# Patient Record
(demographics unavailable — no encounter records)

---

## 2025-04-02 NOTE — PROCEDURE
[FreeTextEntry1] :  Thyroid Ultrasound: 04/01/2025 Right solid isoechoic nodule w/one spot of course of calcification:  23mm x 17mm x 28mm  Thyroid Ultrasound: 03/28/2024 Right dominant isoechoic nodule w/ small course of calcification: 22mm x 20mm x 28mm   Bone Mineral Density: 03/28/2024 Indication: Comparison to 2023, assess response to change in medication Spine: not performed  Total hip: -2.4, osteopenia, +6.4% Femoral neck: -2.8, osteoporosis, +12.6% Proximal radius: -3.1, osteoporosis, +4.9%  Thyroid US - 9/26/2023 Right: dominant nodule 18 mm x 18 mm x 28 mm  Bone Mineral Density: 03/2023  Indication: s/p 1 year of Evenity Spine not measured.  Total hip: -2.7, osteoporosis, +6.2% Femoral neck: -3.3, osteoporosis. +8.5% Proximal radius: -3.5, osteoporosis, stable  Bone mineral density: 02/07/2022  Indication: vs. outside study 2021 Spine: not performed Total hip: -3.0 osteoporosis Femoral neck: -3.7 osteoporosis Proximal radius: -3.4 osteoporosis  Thyroid US - 02/07/2022 Right: dominant nodule 18 mm x 17 mm x 28 mm  Bone mineral density: 5/2021 Indication: outside study from [facility] Spine: -0.9 spine, not accurate due to arthritis Total hip: -3.0 osteoporosis Femoral neck: -3.9 osteoporosis  Bone mineral density: 2019 Indication: outside study from [facility] Femoral neck: -3.6 osteoporosis

## 2025-04-02 NOTE — ASSESSMENT
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  Pt states she was told of osteoporosis for many years. She had been on Prolia since 2017, initially from Dr. Tex Haley, now from [facility], last dose 9/2021. No fh/o osteoporosis. H/o PMR managed by Dr. Tex Haley, dx in 2018, she has been on chronic prednisone since then, currently on 1mg. She has tried d/c prednisone in the past but symptoms return off rx. No h/o fx. No unusual risks for osteoporosis. Outside BMD 05/2021 indicates incorrectly analyzed spine and osteoporosis in spine, severely low in femoral neck despite use of Prolia. BMD 02/2022 indicated low osteoporosis in hip and proximal radius, lowest in femoral neck. At this time 02/2022, she was switched to Evenity, and has since completed 1 year of treatment with Evenity. BMD 03/2023 shows improved osteoporosis in hips, stable osteoporosis in the forearm. Spine was excluded for significant osteoarthritis. Pt transitioned to Prolia 03/2023. BMD 03/2024 indicated increased osteopenia in total hip, increased osteoporosis in fem neck and prox. radius. BMD results reviewed w/ pt. C/w Prolia buy and bill.   H/o right thyroid nodule, prev managed by Dr. Andrew Wall. No local neck pain. No dysphagia or dysphonia. No raciness, shakiness, heat/cold intolerance, tiredness, or fatigue. No palpitations, tremors, or sudden weight gain/loss. No h/o exposure to RT or drugs that induce thyroid disease such as lithium or amiodarone. TUS 9/26/23 stable from prior. TUS 04/2025 indicates stable R nodule. Observe.  Call Dr. Tex Haley for labs.   F/u in 6 months for Prolia

## 2025-04-02 NOTE — PAST MEDICAL HISTORY
[Surgical Menopause] : The patient is in surgical menopause [Menopause Age____] : age at menopause was [unfilled] [History of Hormone Replacement Treatment] : has no history of hormone replacement treatment

## 2025-04-02 NOTE — PROCEDURE
[FreeTextEntry1] :  Thyroid Ultrasound: 04/01/2025 Right solid isoechoic nodule w/one spot of course of calcification:  23mm x 17mm x 28mm  Thyroid Ultrasound: 03/28/2024 Right dominant isoechoic nodule w/ small course of calcification: 22mm x 20mm x 28mm   Bone Mineral Density: 03/28/2024 Indication: Comparison to 2023, assess response to change in medication Spine: not performed  Total hip: -2.4, osteopenia, +6.4% Femoral neck: -2.8, osteoporosis, +12.6% Proximal radius: -3.1, osteoporosis, +4.9%  Thyroid US - 9/26/2023 Right: dominant nodule 18 mm x 18 mm x 28 mm  Bone Mineral Density: 03/2023  Indication: s/p 1 year of Evenity Spine not measured.  Total hip: -2.7, osteoporosis, +6.2% Femoral neck: -3.3, osteoporosis. +8.5% Proximal radius: -3.5, osteoporosis, stable  Bone mineral density: 02/07/2022  Indication: vs. outside study 2021 Spine: not performed Total hip: -3.0 osteoporosis Femoral neck: -3.7 osteoporosis Proximal radius: -3.4 osteoporosis  Thyroid US - 02/07/2022 Right: dominant nodule 18 mm x 17 mm x 28 mm  Bone mineral density: 5/2021 Indication: outside study from [facility] Spine: -0.9 spine, not accurate due to arthritis Total hip: -3.0 osteoporosis Femoral neck: -3.9 osteoporosis  Bone mineral density: 2019 Indication: outside study from [facility] Femoral neck: -3.6 osteoporosis

## 2025-04-02 NOTE — END OF VISIT
[FreeTextEntry3] :  This note was written by Desiree Echeverria on (April 01, 2025) acting as a medical scribe for Dr. Buck This note was authored by the medical scribe for me. I have reviewed, edited, and revised the note as needed. I am in agreement with the exam findings, imaging findings, and treatment plan.  Guicho Buck MD

## 2025-04-02 NOTE — HISTORY OF PRESENT ILLNESS
[Denosumab (Prolia)] : Denosumab [Taking Steroids] : a history of taking steroids [Low Calcium Intake] : no low calcium intake [Low Vit D Intake/Exposure] : no low vitamin D intake [Premat. Menopause (natural)] : no history of premature menopause [Premat. Menopause (surgery)] : no history of premature surgical menopause [Amenorrhea] : no amenorrhea [Disordered Eating] : no history of disordered eating [Hyperparathyroidism] : no history of hyperparathyroidism [Diabetes] : no history of diabetes [Kidney Stones] : no history of kidney stones [Excessive Alcohol Intake] : no excessive alcohol intake [Excessive Soda] : no excessive soda [Sedentary] : no sedentary lifestyle [Current Smoking___(ppd)] : not currently smoking [Previous Fragility Fracture] : no previous fragility fracture [Family History of Breast Cancer] : no family history of breast cancer [Family History of Hip Fracture] : no family history of hip fracture [Family History of Osteoporosis] : no family history of osteoporosis [History of Radiation Therapy] : no history of radiation therapy [History of Blood Clots] : no history of blood clots [High Fall Risk] : no fall risk [Frequent Falls] : no frequent falls [Uses a Walker/Cane] : no use of a walker/cane [FreeTextEntry1] :  Pt returns for a follow-up visit for osteoporosis. Pt began Prolia 03/2023. No major surgeries, hospitalizations, fractures or changes in medication. Up to date with dentist. No major dental work planned. Patient is on prednisone 1 mg twice a day Pt states she was told of osteoporosis for many years. She had been on Prolia since 2017, initially from Dr. Tex Haley, then from [facility], last dose 9/2021. No fh/o osteoporosis. H/o PMR managed by Dr. Tex Haley, dx in 2018, she has been on chronic prednisone since then, initially on 25 mg, now on 1 mg. She has tried d/c prednisone in the past but symptoms return off rx. . Outside BMD 05/2021 indicates incorrectly analyzed spine and osteoporosis in spine, severely low in femoral neck despite use of Prolia. BMD 02/2022 indicated low osteoporosis in hip and proximal radius, lowest in femoral neck. At this time 02/2022, she was switched to Evenity, and has since completed 1 year of treatment with Evenity. BMD 03/2023 shows improved osteoporosis in hips, stable osteoporosis in the forearm. Spine was excluded for significant osteoarthritis. Pt transitioned to Prolia 03/2023. BMD 03/2024 indicated increased osteopenia in total hip, increased osteoporosis in fem neck and prox. radius. BMD results reviewed w/ pt.    H/o thyroid nodules, managed by Dr. Andrew Wall. No local neck pain. No dysphagia or dysphonia. No raciness, shakiness, heat/cold intolerance, tiredness, or fatigue. No palpitations, tremors, or sudden weight gain/loss. No h/o exposure to RT or drugs that induce thyroid disease such as lithium or amiodarone.  H/o SIBO methane dx in June 2022 s/p food poisoning (campylobacter). Has been on Motegrity x3 months for chronic constipation. Initially saw nutritionist, on a low fermentation diet, but sx (constipation, bloating, cramping pain) did not resolve. Pt is being followed by Dr. Hannah Mchugh.   H/o bilateral oophorectomy 5 years apart around age 45. No h/o fx. No h/o kidney stones or calcium problems. No h/o anorexia nervosa.  No other unusual risks for osteoporosis. No h/o RT.   No h/o Paget's disease. No h/o DVT or PE. Surgical menopause due to bilateral oophorectomy 5 years apart age ~ age 45, w/ intact uterus. No HRT use. Up to date with mammography and GYN. Pt takes Ca 500 mg and Vitamin D 1000 IU daily. Former smoker, d/c early 20's.

## 2025-04-02 NOTE — ASSESSMENT
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  Pt states she was told of osteoporosis for many years. She had been on Prolia since 2017, initially from Dr. Tex Haley, now from [facility], last dose 9/2021. No fh/o osteoporosis. H/o PMR managed by Dr. Tex Haley, dx in 2018, she has been on chronic prednisone since then, currently on 1mg. She has tried d/c prednisone in the past but symptoms return off rx. No h/o fx. No unusual risks for osteoporosis. Outside BMD 05/2021 indicates incorrectly analyzed spine and osteoporosis in spine, severely low in femoral neck despite use of Prolia. BMD 02/2022 indicated low osteoporosis in hip and proximal radius, lowest in femoral neck. At this time 02/2022, she was switched to Evenity, and has since completed 1 year of treatment with Evenity. BMD 03/2023 shows improved osteoporosis in hips, stable osteoporosis in the forearm. Spine was excluded for significant osteoarthritis. Pt transitioned to Prolia 03/2023. BMD 03/2024 indicated increased osteopenia in total hip, increased osteoporosis in fem neck and prox. radius. BMD results reviewed w/ pt. C/w Prolia buy and bill.   H/o right thyroid nodule, prev managed by Dr. Andrew Wall. No local neck pain. No dysphagia or dysphonia. No raciness, shakiness, heat/cold intolerance, tiredness, or fatigue. No palpitations, tremors, or sudden weight gain/loss. No h/o exposure to RT or drugs that induce thyroid disease such as lithium or amiodarone. TUS 9/26/23 stable from prior. TUS 04/2025 indicates stable R nodule. Observe.  Call Dr. Tex Haley for labs.   F/u in 6 months for Prolia

## 2025-04-02 NOTE — PHYSICAL EXAM
[Alert] : alert [Well Nourished] : well nourished [No Acute Distress] : no acute distress [Well Developed] : well developed [Normal Sclera/Conjunctiva] : normal sclera/conjunctiva [EOMI] : extra ocular movement intact [No Proptosis] : no proptosis [Normal Lips/Gums] : the lips and gums were normal [Normal Oropharynx] : the oropharynx was normal [No Respiratory Distress] : no respiratory distress [Clear to Auscultation] : lungs were clear to auscultation bilaterally [Normal S1, S2] : normal S1 and S2 [Normal Rate] : heart rate was normal [Regular Rhythm] : with a regular rhythm [No Edema] : no peripheral edema [Normal Bowel Sounds] : normal bowel sounds [Not Tender] : non-tender [Not Distended] : not distended [Soft] : abdomen soft [Normal Anterior Cervical Nodes] : no anterior cervical lymphadenopathy [No Spinal Tenderness] : no spinal tenderness [Spine Straight] : spine straight [No Stigmata of Cushings Syndrome] : no stigmata of Cushings Syndrome [Normal Gait] : normal gait [Normal Reflexes] : deep tendon reflexes were 2+ and symmetric [No Tremors] : no tremors [Oriented x3] : oriented to person, place, and time [Normal Rate and Effort] : normal respiratory rate and effort [de-identified] : right thyroid nodule

## 2025-04-02 NOTE — PHYSICAL EXAM
[Alert] : alert [Well Nourished] : well nourished [No Acute Distress] : no acute distress [Well Developed] : well developed [Normal Sclera/Conjunctiva] : normal sclera/conjunctiva [EOMI] : extra ocular movement intact [No Proptosis] : no proptosis [Normal Lips/Gums] : the lips and gums were normal [Normal Oropharynx] : the oropharynx was normal [No Respiratory Distress] : no respiratory distress [Clear to Auscultation] : lungs were clear to auscultation bilaterally [Normal S1, S2] : normal S1 and S2 [Normal Rate] : heart rate was normal [Regular Rhythm] : with a regular rhythm [No Edema] : no peripheral edema [Normal Bowel Sounds] : normal bowel sounds [Not Tender] : non-tender [Not Distended] : not distended [Soft] : abdomen soft [Normal Anterior Cervical Nodes] : no anterior cervical lymphadenopathy [No Spinal Tenderness] : no spinal tenderness [Spine Straight] : spine straight [No Stigmata of Cushings Syndrome] : no stigmata of Cushings Syndrome [Normal Gait] : normal gait [Normal Reflexes] : deep tendon reflexes were 2+ and symmetric [No Tremors] : no tremors [Oriented x3] : oriented to person, place, and time [Normal Rate and Effort] : normal respiratory rate and effort [de-identified] : right thyroid nodule

## 2025-05-13 NOTE — HISTORY OF PRESENT ILLNESS
[Hearing Loss] : hearing loss [de-identified] : Ms. VALLEJO is a 82 year-old female h/o b/l SNHL (uses hearing aids) who presents for initial evaluation of impacted cerumen.  She was sent from audiologist to remove cerumen to maximize hearing test results.

## 2025-05-13 NOTE — PROCEDURE
[Risk and Benefits Discussed] : The purpose, risks, discomforts, benefits and alternatives of the procedure have been explained to the patient including no treatment. [Cerumen Impaction] : Cerumen Impaction [Same] : same as the Pre Op Dx. [] : Removal of Cerumen [facility] [FreeTextEntry6] : After informed verbal consent is obtained, cerumen is removed from both ear canala after being applied liquid Colace in the ear. The cerumen was removed using a microscope and suctioning. The patient tolerated the procedure well.

## 2025-05-13 NOTE — PHYSICAL EXAM
[Midline] : trachea located in midline position [Normal] : no rashes [de-identified] : b/l impacted cerumen - removed